# Patient Record
Sex: MALE | Race: WHITE
[De-identification: names, ages, dates, MRNs, and addresses within clinical notes are randomized per-mention and may not be internally consistent; named-entity substitution may affect disease eponyms.]

---

## 2018-08-18 ENCOUNTER — HOSPITAL ENCOUNTER (EMERGENCY)
Dept: HOSPITAL 80 - FED | Age: 38
Discharge: HOME | End: 2018-08-18
Payer: COMMERCIAL

## 2018-08-18 VITALS — DIASTOLIC BLOOD PRESSURE: 70 MMHG | SYSTOLIC BLOOD PRESSURE: 129 MMHG

## 2018-08-18 DIAGNOSIS — M54.5: Primary | ICD-10-CM

## 2018-08-18 NOTE — EDPHY
HPI/HX/ROS/PE/MDM


Narrative: 


CHIEF COMPLAINT: Back pain





HPI: 


The patient is a 39 y/o male with a history of Klinefelter syndrome and heroin 

and cocaine addiction (sober for 1 year), complaining of low back pain onset 2 

days ago. While walking to buy groceries he accidently twisted his lower back 

and subsequently developed the pain. He denies falling or hitting his back. He 

took 2 Aleve and 2 Advil today without relief of symptoms. No chest pain, 

shortness of breath, abdominal pain, urinary or bowel complaints, numbness, 

paresthesias, fever.





REVIEW OF SYSTEMS:


Aside from elements discussed in the HPI, a comprehensive 10-point review of 

systems was reviewed and is negative.





PMH: Klinefelter syndrome, heroin and cocaine addiction (sober for 1 year)





SOCIAL HISTORY: Lives in [place], single, not-employed





PHYSICAL EXAM:


General: Patient is alert, possible developmental delay, in no acute distress.


ENT: Eyes are normal to inspection.  ENT inspection normal.


Neck: Normal inspection.  Full range of motion.


Respiratory: No respiratory distress.  Breath sounds normal bilaterally.


Cardiovascular: Regular rate and rhythm.  Strong peripheral pulses.  Normal cap 

refill.


Abdomen: The abdomen is nontender to palpation. There are no peritoneal signs. 

There are normal bowel sounds.


Back: Normal to inspection. Diffuse low back tenderness to light palpation.


Skin: Normal color.  No rash.  Warm and dry.


Extremities: Normal appearance.  Full range of motion.


Neuro: Oriented x3.  Normal motor function.  Normal sensory function.





ED Course: 





1930: Reassessed patient; he is requesting pain medications and a muscle 

relaxer for his low back pain. He does not need laboratory or imaging studies 

at this time.





1940: I reviewed patient on [facility].





1945: Reassessed patient and discussed take home bottle of Flexeril. I have 

advised him to take Aleve for pain and followup with his PCP. Return 

precautions provided; patient is comfortable with this plan.





MDM: 





This patient presents with atraumatic low back pain but there are several red 

flags about his presentation.  He has a history of substance abuse, a history 

of mental illness, and specifically asks me for "the strongest possible pain 

medication."  His exam is inconsistent and he jumps off the table with even 

light touch anywhere on this back.  I considered epidural abscess, but there is 

no focal pain and patient states pain began acutely today after twisting the 

wrong way.  He is afebrile.  I will give the patient a short course of muscle 

relaxants and he will need to follow-up with his PCP for further evaluation. 





General


Time Seen by Provider: 08/18/18 19:12


Initial Vital Signs: 


 Initial Vital Signs











Temperature (C)  36.5 C   08/18/18 19:00


 


Heart Rate  85   08/18/18 19:00


 


Respiratory Rate  18   08/18/18 19:00


 


Blood Pressure  129/70 H  08/18/18 19:00


 


O2 Sat (%)  96   08/18/18 19:00








 











O2 Delivery Mode               Room Air














Allergies/Adverse Reactions: 


 





No Known Allergies Allergy (Verified 08/18/18 18:59)


 








Home Medications: 














 Medication  Instructions  Recorded


 


NK [No Known Home Meds]  08/18/18














Departure





- Departure


Disposition: Home, Routine, Self-Care


Clinical Impression: 


 Low back pain





Condition: Good


Instructions:  Cyclobenzaprine (By mouth), Low Back Strain (ED), Acute Low Back 

Pain (ED), Lower Back Exercises (ED)


Additional Instructions: 


Take Aleve as directed for pain and inflammation.





Take Flexeril as prescribed. 





Follow up with your primary care provider within 72 hours.





Return to the emergency department for severe pain, fever, numbness, difficulty 

walking, change in location or nature of pain or other concerns.  





Try using a heating pad. 





Referrals: 


[facility],. [Clinic] - As per Instructions


Report Scribed for: Perfecto Collado


Report Scribed by: Shena Lozada


Date of Report: 08/18/18


Time of Report: 19:13


Physician Review and Approval Statement: Portions of this note were transcribed 

by an ED scribe.  I personally performed the history, physical exam, and 

medical decision making; and confirm the accuracy of the information in the 

transcribed note.

## 2018-10-03 ENCOUNTER — HOSPITAL ENCOUNTER (EMERGENCY)
Dept: HOSPITAL 80 - FED | Age: 38
Discharge: LEFT BEFORE DISCHARGE | End: 2018-10-03
Payer: MEDICAID

## 2018-10-03 DIAGNOSIS — Z53.21: Primary | ICD-10-CM
